# Patient Record
Sex: FEMALE | Race: WHITE | URBAN - METROPOLITAN AREA
[De-identification: names, ages, dates, MRNs, and addresses within clinical notes are randomized per-mention and may not be internally consistent; named-entity substitution may affect disease eponyms.]

---

## 2017-08-31 ENCOUNTER — FOLLOW UP ESTABLISHED (OUTPATIENT)
Dept: URBAN - METROPOLITAN AREA CLINIC 24 | Facility: CLINIC | Age: 72
End: 2017-08-31
Payer: MEDICARE

## 2017-08-31 DIAGNOSIS — H25.813 COMBINED FORMS OF AGE-RELATED CATARACT, BILATERAL: Primary | ICD-10-CM

## 2017-08-31 PROCEDURE — 92015 DETERMINE REFRACTIVE STATE: CPT | Performed by: OPTOMETRIST

## 2017-08-31 PROCEDURE — 92134 CPTRZ OPH DX IMG PST SGM RTA: CPT | Performed by: OPTOMETRIST

## 2017-08-31 PROCEDURE — 92014 COMPRE OPH EXAM EST PT 1/>: CPT | Performed by: OPTOMETRIST

## 2017-08-31 RX ORDER — CYCLOPENTOLATE HYDROCHLORIDE 10 MG/ML
SOLUTION/ DROPS OPHTHALMIC
Qty: 0 | Refills: 0 | Status: ACTIVE
Start: 2017-08-31

## 2017-08-31 RX ORDER — GABAPENTIN 100 MG/1
100 MG CAPSULE ORAL
Qty: 0 | Refills: 0 | Status: ACTIVE
Start: 2017-08-31

## 2017-08-31 RX ORDER — FUROSEMIDE 40 MG/1
40 MG TABLET ORAL
Qty: 0 | Refills: 0 | Status: ACTIVE
Start: 2017-08-31

## 2017-08-31 RX ORDER — VALSARTAN 80 MG/1
80 MG TABLET ORAL
Qty: 0 | Refills: 0 | Status: INACTIVE
Start: 2017-08-31 | End: 2020-01-23

## 2017-08-31 RX ORDER — IRON 18 MG
18 MG TABLET ORAL
Qty: 0 | Refills: 0 | Status: ACTIVE
Start: 2017-08-31

## 2017-08-31 RX ORDER — GUAIFENESIN AND PHENYLEPHRINE HCL 400; 10 MG/1; MG/1
500 MG TABLET ORAL
Qty: 0 | Refills: 0 | Status: ACTIVE
Start: 2017-08-31

## 2017-08-31 ASSESSMENT — KERATOMETRY
OS: 43.40
OD: 43.01

## 2017-08-31 ASSESSMENT — INTRAOCULAR PRESSURE
OD: 15
OS: 15

## 2017-08-31 ASSESSMENT — VISUAL ACUITY: OD: 20/30

## 2018-03-14 ENCOUNTER — FOLLOW UP ESTABLISHED (OUTPATIENT)
Dept: URBAN - METROPOLITAN AREA CLINIC 24 | Facility: CLINIC | Age: 73
End: 2018-03-14
Payer: MEDICARE

## 2018-03-14 PROCEDURE — 92134 CPTRZ OPH DX IMG PST SGM RTA: CPT | Performed by: OPHTHALMOLOGY

## 2018-03-14 PROCEDURE — 92014 COMPRE OPH EXAM EST PT 1/>: CPT | Performed by: OPHTHALMOLOGY

## 2018-03-14 ASSESSMENT — INTRAOCULAR PRESSURE
OS: 21
OD: 18

## 2019-04-11 ENCOUNTER — FOLLOW UP ESTABLISHED (OUTPATIENT)
Dept: URBAN - METROPOLITAN AREA CLINIC 24 | Facility: CLINIC | Age: 74
End: 2019-04-11
Payer: MEDICARE

## 2019-04-11 DIAGNOSIS — H34.12 CENTRAL RETINAL ARTERY OCCLUSION, LEFT EYE: ICD-10-CM

## 2019-04-11 PROCEDURE — 92014 COMPRE OPH EXAM EST PT 1/>: CPT | Performed by: OPHTHALMOLOGY

## 2019-04-11 PROCEDURE — 92134 CPTRZ OPH DX IMG PST SGM RTA: CPT | Performed by: OPHTHALMOLOGY

## 2019-04-11 ASSESSMENT — INTRAOCULAR PRESSURE
OD: 10
OS: 10

## 2019-10-30 ENCOUNTER — OFFICE VISIT (OUTPATIENT)
Dept: URBAN - METROPOLITAN AREA CLINIC 23 | Facility: CLINIC | Age: 74
End: 2019-10-30
Payer: MEDICARE

## 2019-10-30 DIAGNOSIS — H16.223 KERATOCONJUNCT SICCA, NOT SPECIFIED AS SJOGREN'S, BILATERAL: Primary | ICD-10-CM

## 2019-10-30 DIAGNOSIS — H35.3132 NEXDTVE AGE-RELATED MCLR DEGN, BILATERAL, INTERMED DRY STAGE: ICD-10-CM

## 2019-10-30 PROCEDURE — 92004 COMPRE OPH EXAM NEW PT 1/>: CPT | Performed by: OPTOMETRIST

## 2019-10-30 ASSESSMENT — INTRAOCULAR PRESSURE
OS: 16
OD: 18

## 2019-10-30 NOTE — IMPRESSION/PLAN
Impression: Nexdtve age-related mclr degn, bilateral, intermed dry stage: H35.3132. Plan: Discussed diagnosis in detail with patient. Advised patient of condition. Patient instructed to take daily multi vitamin. Use of vitamins has shown to improve the effects of ARMD. Will continue to observe condition/symptoms.

## 2019-10-30 NOTE — IMPRESSION/PLAN
Impression: Keratoconjunct sicca, not specified as Sjogren's, bilateral: N36.547. Plan: Discussed diagnosis in detail with patient. Reassured patient of dry eyes and treatment. Will continue to observe condition/symptoms. Patient instructed to use ATs QID OU and Genteal gel QHS OU. Patient to call if symptoms progress. Sample of ATs given today.

## 2019-12-04 ENCOUNTER — OFFICE VISIT (OUTPATIENT)
Dept: URBAN - METROPOLITAN AREA CLINIC 23 | Facility: CLINIC | Age: 74
End: 2019-12-04
Payer: MEDICARE

## 2019-12-04 PROCEDURE — 99212 OFFICE O/P EST SF 10 MIN: CPT | Performed by: OPTOMETRIST

## 2019-12-04 NOTE — IMPRESSION/PLAN
Impression: Keratoconjunct sicca, not specified as Sjogren's, bilateral: M49.061. Plan: Discussed diagnosis in detail with patient. Reassured patient of dry eyes and treatment. Will continue to observe condition/symptoms. Patient instructed to continue using ATs QID OU and Genteal gel QHS OU. Patient to call if symptoms progress. Condition is improving. Systane Gel AT ok to use.

## 2020-01-23 ENCOUNTER — OFFICE VISIT (OUTPATIENT)
Dept: URBAN - METROPOLITAN AREA CLINIC 23 | Facility: CLINIC | Age: 75
End: 2020-01-23
Payer: MEDICARE

## 2020-01-23 PROCEDURE — 99213 OFFICE O/P EST LOW 20 MIN: CPT | Performed by: OPTOMETRIST

## 2020-01-23 ASSESSMENT — KERATOMETRY
OD: 43.13
OS: 43.50

## 2020-01-23 NOTE — IMPRESSION/PLAN
Impression: Keratoconjunct sicca, not specified as Sjogren's, bilateral: Z93.382. Plan: Discussed diagnosis in detail with patient. Reassured patient of dry eyes and treatment. Will continue to observe condition/symptoms. Patient instructed to continue using ATs QID OU and Genteal gel QHS OU. Patient to call if symptoms progress. Continue current regiment x  3 months. Systane Gel AT ok to use.

## 2020-11-10 ENCOUNTER — OFFICE VISIT (OUTPATIENT)
Dept: URBAN - METROPOLITAN AREA CLINIC 23 | Facility: CLINIC | Age: 75
End: 2020-11-10
Payer: MEDICARE

## 2020-11-10 DIAGNOSIS — H25.13 AGE-RELATED NUCLEAR CATARACT, BILATERAL: Primary | ICD-10-CM

## 2020-11-10 PROCEDURE — 92012 INTRM OPH EXAM EST PATIENT: CPT | Performed by: OPTOMETRIST

## 2020-11-10 ASSESSMENT — INTRAOCULAR PRESSURE
OD: 19
OS: 19

## 2020-11-10 NOTE — IMPRESSION/PLAN
Impression: Nonexudative age-related macular degeneration, bilateral, intermediate dry stage: H35.3132. Plan: Discussed diagnosis in detail with patient. Reassured patient of current condition and treatment. Will continue to observe condition and or symptoms. No change to current treatment.

## 2020-11-10 NOTE — IMPRESSION/PLAN
Impression: Age-related nuclear cataract, bilateral: H25.13. Plan: Cataracts account for the patient's complaints. Discussed diagnosis in detail with patient. Discussed all R/B's and procedures. Patient understands changing glasses prescription will not significantly improve vision. Patient elects to have surgery, phacoemulsification with intraocular lens recommended. Discussed lens options of Toric/Multifocal. Recommend standard IOL, target distance, RL2.  Will refer to cataract surgeon for pre-operative evaluation OD

## 2022-04-19 ENCOUNTER — OFFICE VISIT (OUTPATIENT)
Dept: URBAN - METROPOLITAN AREA CLINIC 27 | Facility: CLINIC | Age: 77
End: 2022-04-19
Payer: MEDICARE

## 2022-04-19 DIAGNOSIS — H43.813 VITREOUS DEGENERATION, BILATERAL: ICD-10-CM

## 2022-04-19 DIAGNOSIS — H25.12 AGE-RELATED NUCLEAR CATARACT, LEFT EYE: ICD-10-CM

## 2022-04-19 DIAGNOSIS — Z96.1 PRESENCE OF INTRAOCULAR LENS: ICD-10-CM

## 2022-04-19 DIAGNOSIS — H34.12 CENTRAL RETINAL ARTERY OCCLUSION, LEFT EYE: ICD-10-CM

## 2022-04-19 DIAGNOSIS — H35.3113 NONEXUDATIVE AGE-RELATED MACULAR DEGENERATION, RIGHT EYE, ADVANCED ATROPHIC WITHOUT SUBFOVEAL INVOLVEMENT: ICD-10-CM

## 2022-04-19 DIAGNOSIS — G45.3 AMAUROSIS FUGAX: Primary | ICD-10-CM

## 2022-04-19 PROCEDURE — 99204 OFFICE O/P NEW MOD 45 MIN: CPT | Performed by: OPHTHALMOLOGY

## 2022-04-19 PROCEDURE — 92235 FLUORESCEIN ANGRPH MLTIFRAME: CPT | Performed by: OPHTHALMOLOGY

## 2022-04-19 PROCEDURE — 92134 CPTRZ OPH DX IMG PST SGM RTA: CPT | Performed by: OPHTHALMOLOGY

## 2022-04-19 RX ORDER — APIXABAN 5 MG/1
5 MG TABLET, FILM COATED ORAL
Qty: 0 | Refills: 0 | Status: ACTIVE
Start: 2022-04-19

## 2022-04-19 RX ORDER — SACUBITRIL AND VALSARTAN 97; 103 MG/1; MG/1
TABLET, FILM COATED ORAL
Qty: 0 | Refills: 0 | Status: ACTIVE
Start: 2022-04-19

## 2022-04-19 RX ORDER — PRAVASTATIN SODIUM 10 MG/1
10 MG TABLET ORAL
Qty: 0 | Refills: 0 | Status: ACTIVE
Start: 2022-04-19

## 2022-04-19 RX ORDER — CARVEDILOL 25 MG/1
25 MG TABLET, FILM COATED ORAL
Qty: 0 | Refills: 0 | Status: ACTIVE
Start: 2022-04-19

## 2022-04-19 RX ORDER — FUROSEMIDE 20 MG/1
20 MG TABLET ORAL
Qty: 0 | Refills: 0 | Status: ACTIVE
Start: 2022-04-19

## 2022-04-19 ASSESSMENT — INTRAOCULAR PRESSURE
OD: 11
OS: 10

## 2022-04-19 NOTE — IMPRESSION/PLAN
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 Plan: --pt denies GCA-related symptoms 
--FA shows good macular perfusion, normal A/V transit time
--findings/diagnosis discussed in detail with pt
--rec urgent eval with PCP and/or cardiology --order b/l carotid u/s to eval for embolic source
--rec start ASA 81 mg daily until f/u with PCP/cardiology
--CVA precautions discussed RTC 3 months for DFE/OCT OD

## 2022-04-19 NOTE — IMPRESSION/PLAN
Impression: Central retinal artery occlusion, left eye: H34.12. Plan: --exam confirms an old CRAO with limited visual potential
--no evidence of NVD or NVE 
--NLP, goal is comfort

## 2022-05-26 NOTE — IMPRESSION/PLAN
Impression: Nonexudative age-related macular degeneration, right eye, advanced atrophic without subfoveal involvement: H35.3113. Plan: --no evidence of exudation on exam or OCT
--geographic RPE atrophy will limit visual potential
--findings/diagnosis discussed with pt in detail
--AREDS-2 vitamins and Amsler grid self-monitoring reviewed
--dietary recs, avoid smoking, UV protection discussed --pt to call with s/s decreased vision/metamorphopsia
--return to Dr. Swapnil Davila for MRx update [employer] Preamble Text (Leave Blank If You Do Not Want): Undermining was performed with blunt dissection.

## 2023-03-30 ENCOUNTER — OFFICE VISIT (OUTPATIENT)
Dept: URBAN - METROPOLITAN AREA CLINIC 21 | Facility: CLINIC | Age: 78
End: 2023-03-30
Payer: MEDICARE

## 2023-03-30 DIAGNOSIS — H25.12 AGE-RELATED NUCLEAR CATARACT, LEFT EYE: ICD-10-CM

## 2023-03-30 DIAGNOSIS — H34.12 CENTRAL RETINAL ARTERY OCCLUSION, LEFT EYE: ICD-10-CM

## 2023-03-30 DIAGNOSIS — H04.123 DRY EYE SYNDROME OF BILATERAL LACRIMAL GLANDS: ICD-10-CM

## 2023-03-30 DIAGNOSIS — H26.491 OTHER SECONDARY CATARACT, RIGHT EYE: ICD-10-CM

## 2023-03-30 DIAGNOSIS — H35.3113 NONEXUDATIVE AGE-RELATED MACULAR DEGENERATION, RIGHT EYE, ADVANCED ATROPHIC WITHOUT SUBFOVEAL INVOLVEMENT: Primary | ICD-10-CM

## 2023-03-30 PROCEDURE — 99204 OFFICE O/P NEW MOD 45 MIN: CPT

## 2023-03-30 ASSESSMENT — INTRAOCULAR PRESSURE
OS: 19
OD: 18

## 2023-03-30 ASSESSMENT — VISUAL ACUITY
OS: NLP
OD: 20/200

## 2023-03-30 NOTE — IMPRESSION/PLAN
Impression: Other secondary cataract, right eye: H26.491. Plan: Pt educated on condition. Pt is asymptomatic, no treatment recommended at this time. Monitor. Pt advised to RTC with any changes in vision.

## 2023-03-30 NOTE — IMPRESSION/PLAN
Impression: Central retinal artery occlusion, left eye: H34.12. Plan: Hx of CRAO. Pt educated on findings. Condition is stable. Continue care with Dr. Chloe Torres as scheduled.

## 2023-04-24 ENCOUNTER — OFFICE VISIT (OUTPATIENT)
Dept: URBAN - METROPOLITAN AREA CLINIC 7 | Facility: CLINIC | Age: 78
End: 2023-04-24
Payer: MEDICARE

## 2023-04-24 DIAGNOSIS — H43.813 VITREOUS DEGENERATION, BILATERAL: ICD-10-CM

## 2023-04-24 DIAGNOSIS — Z96.1 PRESENCE OF INTRAOCULAR LENS: ICD-10-CM

## 2023-04-24 DIAGNOSIS — G45.3 AMAUROSIS FUGAX: ICD-10-CM

## 2023-04-24 DIAGNOSIS — H34.12 CENTRAL RETINAL ARTERY OCCLUSION, LEFT EYE: ICD-10-CM

## 2023-04-24 DIAGNOSIS — H35.3113 NONEXUDATIVE AGE-RELATED MACULAR DEGENERATION, RIGHT EYE, ADVANCED ATROPHIC WITHOUT SUBFOVEAL INVOLVEMENT: Primary | ICD-10-CM

## 2023-04-24 DIAGNOSIS — H25.12 AGE-RELATED NUCLEAR CATARACT, LEFT EYE: ICD-10-CM

## 2023-04-24 PROCEDURE — 92134 CPTRZ OPH DX IMG PST SGM RTA: CPT | Performed by: OPHTHALMOLOGY

## 2023-04-24 PROCEDURE — 92014 COMPRE OPH EXAM EST PT 1/>: CPT | Performed by: OPHTHALMOLOGY

## 2023-04-24 ASSESSMENT — INTRAOCULAR PRESSURE
OD: 16
OS: 15

## 2023-04-24 NOTE — IMPRESSION/PLAN
Impression: Presence of intraocular lens: Z96.1. Right.  Plan: --developing PCO, return to Dr. Clarisa Wallace for YAG OD

## 2023-04-24 NOTE — IMPRESSION/PLAN
Impression: Nonexudative age-related macular degeneration, right eye, advanced atrophic without subfoveal involvement: H35.3113. Plan: --no evidence of exudation on exam or OCT
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
--findings/diagnosis discussed with pt in detail
--AREDS-2 vitamins and Amsler grid self-monitoring reviewed
--dietary recs, avoid smoking, UV protection discussed
--will consider bimonthly  Syfovre once readily available --pt to call with s/s decreased vision/metamorphopsia RTC 1-2 months for OCT/Syfovre OD #1